# Patient Record
Sex: FEMALE | Race: BLACK OR AFRICAN AMERICAN | NOT HISPANIC OR LATINO | URBAN - METROPOLITAN AREA
[De-identification: names, ages, dates, MRNs, and addresses within clinical notes are randomized per-mention and may not be internally consistent; named-entity substitution may affect disease eponyms.]

---
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
## 2024-04-10 ENCOUNTER — HOSPITAL ENCOUNTER (EMERGENCY)
Facility: HOSPITAL | Age: 24
Discharge: HOME | End: 2024-04-10
Attending: EMERGENCY MEDICINE

## 2024-04-10 VITALS
OXYGEN SATURATION: 100 % | SYSTOLIC BLOOD PRESSURE: 136 MMHG | BODY MASS INDEX: 36.7 KG/M2 | DIASTOLIC BLOOD PRESSURE: 96 MMHG | TEMPERATURE: 99 F | HEIGHT: 64 IN | RESPIRATION RATE: 19 BRPM | WEIGHT: 214.94 LBS | HEART RATE: 97 BPM

## 2024-04-10 DIAGNOSIS — O03.9 MISCARRIAGE: Primary | ICD-10-CM

## 2024-04-10 LAB — HCG INTACT+B SERPL-ACNC: NORMAL MIU/ML

## 2024-04-10 PROCEDURE — 84702 CHORIONIC GONADOTROPIN TEST: CPT | Performed by: NURSE PRACTITIONER

## 2024-04-10 PROCEDURE — 99283 EMERGENCY DEPT VISIT LOW MDM: CPT

## 2024-04-10 NOTE — FIRST PROVIDER EVALUATION
Medical screening examination initiated.  I have conducted a focused provider triage encounter, findings are as follows:    Brief history of present illness:  Patient states 2 days ago she was seen at [facility] and told on ultrasound that it look like she maybe having a miscarriage.  Had a follow up appointment for today but says they canceled her.    There were no vitals filed for this visit.    Pertinent physical exam:  NAD    Brief workup plan:  Repeat hCG    Preliminary workup initiated; this workup will be continued and followed by the physician or advanced practice provider that is assigned to the patient when roomed.

## 2024-04-11 NOTE — ED PROVIDER NOTES
Encounter Date: 4/10/2024       History     Chief Complaint   Patient presents with    Possible Pregnancy     Pt states positive pregnancy test: 3/15/24. Pt states she started cramping, spotting, n/v 4/8. Pt was [facility] and states the doctor told her she had a threatened miscarriage but could not be positive. Denies chest pain or sob.     Patient states 2 days ago she had an ultrasound for abdominal pain believes she is about 10 weeks pregnant was told that there was no fetal activity and to follow up with OBGYN she was scheduled to follow up with OBGYN today but they canceled her for no reason she is aware of.  Patient denies worsening of pain or problem just wants to be evaluated.        Review of patient's allergies indicates:  No Known Allergies  No past medical history on file.  No past surgical history on file.  No family history on file.     Review of Systems   Constitutional:  Negative for fever.   HENT:  Negative for sore throat.    Respiratory:  Negative for shortness of breath.    Cardiovascular:  Negative for chest pain.   Gastrointestinal:  Negative for nausea.   Genitourinary:  Negative for dysuria.   Musculoskeletal:  Negative for back pain.   Skin:  Negative for rash.   Neurological:  Negative for weakness.   Hematological:  Does not bruise/bleed easily.       Physical Exam     Initial Vitals [04/10/24 1546]   BP Pulse Resp Temp SpO2   (!) 136/96 97 19 98.8 °F (37.1 °C) 100 %      MAP       --         Physical Exam    Nursing note and vitals reviewed.  Constitutional: She appears well-developed and well-nourished. She is not diaphoretic. She is active.  Non-toxic appearance. No distress.   HENT:   Head: Normocephalic and atraumatic.   Eyes: Conjunctivae are normal. Right eye exhibits no discharge. Left eye exhibits no discharge. No scleral icterus.   Neck:   Normal range of motion.  Cardiovascular:  Normal rate, regular rhythm and intact distal pulses.           No murmur  "heard.  Pulmonary/Chest: Breath sounds normal. No respiratory distress. She has no wheezes.   Abdominal: She exhibits no distension.   Musculoskeletal:         General: No tenderness. Normal range of motion.      Cervical back: Normal range of motion.     Neurological: She is alert and oriented to person, place, and time. No cranial nerve deficit. GCS score is 15. GCS eye subscore is 4. GCS verbal subscore is 5. GCS motor subscore is 6.   Skin: Skin is warm and dry. Capillary refill takes less than 2 seconds. No rash noted.   Psychiatric: She has a normal mood and affect. Her behavior is normal. Judgment and thought content normal.         ED Course   Procedures  Labs Reviewed   HCG, QUANTITATIVE    Narrative:     Release to patient->Immediate          Imaging Results    None          Medications - No data to display  Medical Decision Making  Differential diagnosis considered but not limited to; inevitable miscarriage, threatened miscarriage.    I Evaluated ultrasound results from [facility] that showed no fetal heart activity and compared that to a descending beta hCG in the ED today.  Patient requesting referral for OBGYN    "1. Single intrauterine pregnancy with estimated ultrasound age of 10 weeks 5   days. No reproducible fetal heart motion with m-mode imaging, although with flow   on color Doppler imaging, and raise concern for pregnancy failure."      Amount and/or Complexity of Data Reviewed  Labs: ordered.                                      Clinical Impression:  Final diagnoses:  [O03.9] Miscarriage (Primary)          ED Disposition Condition    Discharge Stable          ED Prescriptions    None       Follow-up Information       Follow up With Specialties Details Why Contact Info    [facility]ne[facility]   [id number] [id number] [phone]             Yobani Castillo NP  04/10/24 2102    "